# Patient Record
Sex: MALE | Race: WHITE | NOT HISPANIC OR LATINO | Employment: FULL TIME | URBAN - METROPOLITAN AREA
[De-identification: names, ages, dates, MRNs, and addresses within clinical notes are randomized per-mention and may not be internally consistent; named-entity substitution may affect disease eponyms.]

---

## 2022-03-04 ENCOUNTER — APPOINTMENT (OUTPATIENT)
Dept: GENERAL RADIOLOGY | Facility: CLINIC | Age: 27
End: 2022-03-04
Attending: EMERGENCY MEDICINE
Payer: COMMERCIAL

## 2022-03-04 ENCOUNTER — HOSPITAL ENCOUNTER (EMERGENCY)
Facility: CLINIC | Age: 27
Discharge: HOME | End: 2022-03-05
Attending: EMERGENCY MEDICINE | Admitting: EMERGENCY MEDICINE
Payer: COMMERCIAL

## 2022-03-04 DIAGNOSIS — R07.89 ATYPICAL CHEST PAIN: ICD-10-CM

## 2022-03-04 DIAGNOSIS — R20.2 PARESTHESIAS: ICD-10-CM

## 2022-03-04 LAB
ALBUMIN SERPL-MCNC: 4.2 G/DL (ref 3.4–5)
ALP SERPL-CCNC: 71 U/L (ref 40–150)
ALT SERPL W P-5'-P-CCNC: 25 U/L (ref 0–70)
ANION GAP SERPL CALCULATED.3IONS-SCNC: 3 MMOL/L (ref 3–14)
AST SERPL W P-5'-P-CCNC: 19 U/L (ref 0–45)
ATRIAL RATE - MUSE: 64 BPM
BASOPHILS # BLD AUTO: 0 10E3/UL (ref 0–0.2)
BASOPHILS NFR BLD AUTO: 0 %
BILIRUB SERPL-MCNC: 0.5 MG/DL (ref 0.2–1.3)
BUN SERPL-MCNC: 19 MG/DL (ref 7–30)
CALCIUM SERPL-MCNC: 8.8 MG/DL (ref 8.5–10.1)
CHLORIDE BLD-SCNC: 105 MMOL/L (ref 94–109)
CO2 SERPL-SCNC: 30 MMOL/L (ref 20–32)
CREAT SERPL-MCNC: 0.98 MG/DL (ref 0.66–1.25)
D DIMER PPP FEU-MCNC: 0.28 UG/ML FEU (ref 0–0.5)
DIASTOLIC BLOOD PRESSURE - MUSE: NORMAL MMHG
EOSINOPHIL # BLD AUTO: 0.1 10E3/UL (ref 0–0.7)
EOSINOPHIL NFR BLD AUTO: 1 %
ERYTHROCYTE [DISTWIDTH] IN BLOOD BY AUTOMATED COUNT: 11.9 % (ref 10–15)
GFR SERPL CREATININE-BSD FRML MDRD: >90 ML/MIN/1.73M2
GLUCOSE BLD-MCNC: 104 MG/DL (ref 70–99)
HCT VFR BLD AUTO: 42.2 % (ref 40–53)
HGB BLD-MCNC: 14.8 G/DL (ref 13.3–17.7)
IMM GRANULOCYTES # BLD: 0 10E3/UL
IMM GRANULOCYTES NFR BLD: 0 %
INTERPRETATION ECG - MUSE: NORMAL
LYMPHOCYTES # BLD AUTO: 2.8 10E3/UL (ref 0.8–5.3)
LYMPHOCYTES NFR BLD AUTO: 58 %
MCH RBC QN AUTO: 32.1 PG (ref 26.5–33)
MCHC RBC AUTO-ENTMCNC: 35.1 G/DL (ref 31.5–36.5)
MCV RBC AUTO: 92 FL (ref 78–100)
MONOCYTES # BLD AUTO: 0.3 10E3/UL (ref 0–1.3)
MONOCYTES NFR BLD AUTO: 7 %
NEUTROPHILS # BLD AUTO: 1.7 10E3/UL (ref 1.6–8.3)
NEUTROPHILS NFR BLD AUTO: 34 %
NRBC # BLD AUTO: 0 10E3/UL
NRBC BLD AUTO-RTO: 0 /100
P AXIS - MUSE: 79 DEGREES
PLATELET # BLD AUTO: 172 10E3/UL (ref 150–450)
POTASSIUM BLD-SCNC: 3.7 MMOL/L (ref 3.4–5.3)
PR INTERVAL - MUSE: 150 MS
PROT SERPL-MCNC: 7.6 G/DL (ref 6.8–8.8)
QRS DURATION - MUSE: 86 MS
QT - MUSE: 404 MS
QTC - MUSE: 416 MS
R AXIS - MUSE: 48 DEGREES
RBC # BLD AUTO: 4.61 10E6/UL (ref 4.4–5.9)
SODIUM SERPL-SCNC: 138 MMOL/L (ref 133–144)
SYSTOLIC BLOOD PRESSURE - MUSE: NORMAL MMHG
T AXIS - MUSE: 60 DEGREES
TROPONIN I SERPL HS-MCNC: 10 NG/L
VENTRICULAR RATE- MUSE: 64 BPM
WBC # BLD AUTO: 4.8 10E3/UL (ref 4–11)

## 2022-03-04 PROCEDURE — 85025 COMPLETE CBC W/AUTO DIFF WBC: CPT | Performed by: EMERGENCY MEDICINE

## 2022-03-04 PROCEDURE — 85379 FIBRIN DEGRADATION QUANT: CPT | Performed by: EMERGENCY MEDICINE

## 2022-03-04 PROCEDURE — 80053 COMPREHEN METABOLIC PANEL: CPT | Performed by: EMERGENCY MEDICINE

## 2022-03-04 PROCEDURE — 71046 X-RAY EXAM CHEST 2 VIEWS: CPT

## 2022-03-04 PROCEDURE — 84484 ASSAY OF TROPONIN QUANT: CPT | Performed by: EMERGENCY MEDICINE

## 2022-03-04 PROCEDURE — 93005 ELECTROCARDIOGRAM TRACING: CPT

## 2022-03-04 PROCEDURE — 36415 COLL VENOUS BLD VENIPUNCTURE: CPT | Performed by: EMERGENCY MEDICINE

## 2022-03-04 PROCEDURE — 99285 EMERGENCY DEPT VISIT HI MDM: CPT | Mod: 25

## 2022-03-04 ASSESSMENT — ENCOUNTER SYMPTOMS
WEAKNESS: 1
NUMBNESS: 1
DIAPHORESIS: 1
PALPITATIONS: 0
LIGHT-HEADEDNESS: 1
CHEST TIGHTNESS: 1

## 2022-03-05 VITALS
HEIGHT: 76 IN | RESPIRATION RATE: 16 BRPM | WEIGHT: 195 LBS | OXYGEN SATURATION: 98 % | TEMPERATURE: 97.7 F | SYSTOLIC BLOOD PRESSURE: 122 MMHG | DIASTOLIC BLOOD PRESSURE: 65 MMHG | HEART RATE: 57 BPM | BODY MASS INDEX: 23.75 KG/M2

## 2022-03-05 LAB — TROPONIN I SERPL HS-MCNC: 9 NG/L

## 2022-03-05 PROCEDURE — 36415 COLL VENOUS BLD VENIPUNCTURE: CPT | Performed by: EMERGENCY MEDICINE

## 2022-03-05 PROCEDURE — 84484 ASSAY OF TROPONIN QUANT: CPT | Performed by: EMERGENCY MEDICINE

## 2022-03-05 NOTE — ED TRIAGE NOTES
Pt complains of daily episodes of chest tightness, bilateral numbness in his legs, and dizziness for the last week.  Was seen at  for these symptoms previously today.

## 2022-03-05 NOTE — ED PROVIDER NOTES
History     Chief Complaint:  Chest Pain    The history is provided by the patient.      Anish More is a 26 year old male who presents with chest pain. The patient reports that for the past week, he has been experiencing intermittent episodes of numbness (described as pins and needles) and weakness in his bilateral legs, with associated light-headedness, diaphoresis, and left-sided chest pain and tightness. This chest pain does not radiate anywhere, including his back, shoulder, or jaw. Symptoms normally last approximately 15-20 minutes, then resolve on their own.  He played basketball yesterday for about 40 minutes, did not have symptoms.  He felt that he was performing at his normal level.  He was evaluated earlier today for similar symptoms at urgent care, and had an EKG and some blood work done, all of which unremarkable. No exacerbating or alleviating factors. Patient denies any palpitations, tobacco or recreational drug use. Endorses some alcohol use, but none tonight.     Review of Systems   Constitutional: Positive for diaphoresis.   Respiratory: Positive for chest tightness.    Cardiovascular: Positive for chest pain. Negative for palpitations.   Neurological: Positive for weakness, light-headedness and numbness.   All other systems reviewed and are negative.    Allergies:  Cephalexin     Medications:    The patient is not currently taking any prescribed medications.    Past Medical History:    Pyloric stenosis    Left atrial enlargement     Past Surgical History:    Abdominal surgery, pyloric stenosis     Social History:  The patient presents to the ED alone.     Physical Exam     Patient Vitals for the past 24 hrs:   BP Temp Temp src Pulse Resp SpO2 Height Weight   03/05/22 0030 -- -- -- 58 20 98 % -- --   03/05/22 0020 -- -- -- 64 21 97 % -- --   03/05/22 0010 -- -- -- 53 12 98 % -- --   03/05/22 0000 -- -- -- 63 21 97 % -- --   03/04/22 2350 -- -- -- 52 16 97 % -- --   03/04/22 2340 -- -- -- 53 16 97  "% -- --   03/04/22 2330 -- -- -- 53 15 98 % -- --   03/04/22 2320 -- -- -- 58 13 98 % -- --   03/04/22 2310 107/65 -- -- 71 -- 99 % -- --   03/04/22 2129 (!) 147/53 97.7  F (36.5  C) Temporal 57 16 98 % 1.93 m (6' 4\") 88.5 kg (195 lb)       Physical Exam  General: alert, lying comfortably on gurney  HENT: mucous membranes moist  CV: Bradycardic rate, regular rhythm.  No murmurs rubs or gallops.  2+ radial pulses, symmetric bilaterally.  Resp: normal effort, clear throughout, no crackles or wheezing  GI: abdomen soft and nontender, no guarding  MSK: no bony tenderness  Skin: appropriately warm and dry  Extremities: no edema, calves non-tender  Neuro: alert, clear speech, oriented.  Pupils equal round and reactive to light, extraocular movements intact.  Symmetric smile.  Strength 5 out of 5 bilateral upper and lower extremities.  Fine touch sensation grossly intact.  Psych: normal mood and affect    Emergency Department Course   ECG:  ECG taken at 2130, ECG read at 2334  Normal sinus rhythm. Normal ECG.    Rate 64 bpm. OK interval 150 ms. QRS duration 86 ms. QT/QTc 404/416 ms. P-R-T axes 79 48 60.     Imaging:  Chest XR,  PA & LAT   Final Result   IMPRESSION: No pneumothorax or pleural effusion. No focal consolidation. Cardiac mediastinal silhouette within normal limits.          Laboratory:  Labs Ordered and Resulted from Time of ED Arrival to Time of ED Departure   COMPREHENSIVE METABOLIC PANEL - Abnormal       Result Value    Sodium 138      Potassium 3.7      Chloride 105      Carbon Dioxide (CO2) 30      Anion Gap 3      Urea Nitrogen 19      Creatinine 0.98      Calcium 8.8      Glucose 104 (*)     Alkaline Phosphatase 71      AST 19      ALT 25      Protein Total 7.6      Albumin 4.2      Bilirubin Total 0.5      GFR Estimate >90     TROPONIN I - Normal    Troponin I High Sensitivity 10     D DIMER QUANTITATIVE - Normal    D-Dimer Quantitative 0.28     TROPONIN I - Normal    Troponin I High Sensitivity 9   "   CBC WITH PLATELETS AND DIFFERENTIAL    WBC Count 4.8      RBC Count 4.61      Hemoglobin 14.8      Hematocrit 42.2      MCV 92      MCH 32.1      MCHC 35.1      RDW 11.9      Platelet Count 172      % Neutrophils 34      % Lymphocytes 58      % Monocytes 7      % Eosinophils 1      % Basophils 0      % Immature Granulocytes 0      NRBCs per 100 WBC 0      Absolute Neutrophils 1.7      Absolute Lymphocytes 2.8      Absolute Monocytes 0.3      Absolute Eosinophils 0.1      Absolute Basophils 0.0      Absolute Immature Granulocytes 0.0      Absolute NRBCs 0.0       Emergency Department Course:       Reviewed:  I reviewed nursing notes, vitals, past history and care everywhere    Assessments:  2311 I obtained history and examined the patient as noted above.   0033 I rechecked the patient and explained findings. Prepared for discharge.         Disposition:  The patient was discharged to home.    Impression & Plan      HEART Score  Background  Calculates the overall risk of adverse event in patient's presenting with chest pain.  Based on 5 criteria (each assigned 0-2 points) including suspiciousness of history, EKG, age, risk factors and troponin.    Data  26 year old male  does not have a problem list on file.   reports that he has never smoked. He has never used smokeless tobacco.  family history is not on file.  No results found for: TROPI  Criteria   0-2 points for each of 5 items (maximum of 10 points):  Score 0- History slightly suspicious for coronary syndrome  Score 0- EKG Normal  Score 0- Age <45 years old  Score 0- No risk factors for atherosclerotic disease  Score 0- Within normal limits for troponin levels  Interpretation  Risk of adverse outcome  Heart Score: 0  Total Score 0-3- Adverse Outcome Risk 2.5% - Supports early discharge with appropriate follow-up         Medical Decision Making:  Anish More is a 26 year old male is a 26 year old male who presents with chest pain, palpitations, and paresthesias.   The work up in the Emergency Department is negative.  The differential diagnosis of chest pain is broad and includes life threatening etiologies such as acute coronary syndrome, myocardial infarction, pulmonary embolism, acute aortic dissection,  or esophageal rupture.  Other causes may include pneumonia, pneumothorax, pericarditis, myocarditis, pleurisy, esophageal spasm.  No serious etiology for the chest pain were detected today during this visit.  Given he is low risk, negative d-dimer was adequate to rule out PE.  EKG does not show findings of ischemia.  Troponin is unchanged x2, and below the reference range.  He is very low risk for ACS, and given atypical symptoms, provocative testing is not indicated.  Close follow up with primary care is indicated should the pain continue, as further work up may be performed; this was made clear to the patient, who understands.    Critical Care time:  none    Diagnosis:    ICD-10-CM    1. Atypical chest pain  R07.89 Primary Care Referral   2. Paresthesias  R20.2        Discharge Medications:  None.    Scribe Disclosure:  I, Grant Uriostegui, am serving as a scribe at 11:10 PM on 3/4/2022 to document services personally performed by Ashley Herrera MD based on my observations and the provider's statements to me.      Ashley Herrera MD  03/05/22 0634

## 2022-04-03 ENCOUNTER — HEALTH MAINTENANCE LETTER (OUTPATIENT)
Age: 27
End: 2022-04-03

## 2022-10-03 ENCOUNTER — HEALTH MAINTENANCE LETTER (OUTPATIENT)
Age: 27
End: 2022-10-03

## 2023-05-21 ENCOUNTER — HEALTH MAINTENANCE LETTER (OUTPATIENT)
Age: 28
End: 2023-05-21

## 2024-07-28 ENCOUNTER — HEALTH MAINTENANCE LETTER (OUTPATIENT)
Age: 29
End: 2024-07-28